# Patient Record
Sex: MALE | Race: WHITE | URBAN - METROPOLITAN AREA
[De-identification: names, ages, dates, MRNs, and addresses within clinical notes are randomized per-mention and may not be internally consistent; named-entity substitution may affect disease eponyms.]

---

## 2019-02-21 ENCOUNTER — OFFICE VISIT (OUTPATIENT)
Dept: FAMILY MEDICINE CLINIC | Age: 44
End: 2019-02-21

## 2019-02-21 VITALS
RESPIRATION RATE: 18 BRPM | TEMPERATURE: 98 F | WEIGHT: 219.8 LBS | DIASTOLIC BLOOD PRESSURE: 86 MMHG | OXYGEN SATURATION: 97 % | HEART RATE: 70 BPM | SYSTOLIC BLOOD PRESSURE: 136 MMHG

## 2019-02-21 DIAGNOSIS — F33.2 SEVERE EPISODE OF RECURRENT MAJOR DEPRESSIVE DISORDER, WITHOUT PSYCHOTIC FEATURES (HCC): Primary | ICD-10-CM

## 2019-02-21 DIAGNOSIS — I10 ESSENTIAL HYPERTENSION: ICD-10-CM

## 2019-02-21 DIAGNOSIS — F11.20 HEROIN ADDICTION (HCC): ICD-10-CM

## 2019-02-21 RX ORDER — METOPROLOL SUCCINATE 50 MG/1
50 TABLET, EXTENDED RELEASE ORAL DAILY
Qty: 50 TAB | Refills: 0 | Status: SHIPPED | OUTPATIENT
Start: 2019-02-21 | End: 2019-02-25 | Stop reason: CLARIF

## 2019-02-21 RX ORDER — BUPRENORPHINE HYDROCHLORIDE AND NALOXONE HYDROCHLORIDE DIHYDRATE 2; .5 MG/1; MG/1
1 TABLET SUBLINGUAL 2 TIMES DAILY
COMMUNITY
End: 2020-04-10

## 2019-02-21 RX ORDER — METOPROLOL TARTRATE 50 MG/1
TABLET ORAL 2 TIMES DAILY
COMMUNITY
End: 2019-02-25 | Stop reason: SDUPTHER

## 2019-02-21 RX ORDER — SERTRALINE HYDROCHLORIDE 50 MG/1
50 TABLET, FILM COATED ORAL DAILY
Qty: 30 TAB | Refills: 0 | Status: SHIPPED | OUTPATIENT
Start: 2019-02-21 | End: 2020-04-10

## 2019-02-21 RX ORDER — METOPROLOL SUCCINATE 50 MG/1
50 TABLET, EXTENDED RELEASE ORAL 2 TIMES DAILY
Qty: 60 TAB | Refills: 0 | Status: SHIPPED | OUTPATIENT
Start: 2019-02-21 | End: 2019-02-25 | Stop reason: CLARIF

## 2019-02-21 NOTE — PROGRESS NOTES
Identified Patient with two Patient identifiers (Name and ). Two Patient Identifiers confirmed. Reviewed record in preparation for visit and have obtained necessary documentation. Chief Complaint Patient presents with [facility] Establish Care Discuss Labs  Hypertension  Medication Refill  Insomnia Per Patient Problems with staying and falling asleep Visit Vitals /86 (BP 1 Location: Right arm, BP Patient Position: Sitting) Pulse 70 Wt 219 lb 12.8 oz (99.7 kg) SpO2 97% 1. Have you been to the ER, urgent care clinic since your last visit? Hospitalized since your last visit? No 
 
2. Have you seen or consulted any other health care providers outside of the [address] [place] [address] since your last visit? Include any pap smears or colon screening.  No

## 2019-02-21 NOTE — PATIENT INSTRUCTIONS
Learning about Antidepressants What are antidepressants? Antidepressants are medicines that change the levels of some chemicals in the brain. They can help affect moods. There are different types that work on brain chemicals in different ways. These medicines can help treat depression. They are also used for anxiety, eating disorders, post-traumatic stress disorder, and chronic pain. What are some examples? Here are some examples of antidepressants. For each item in the list, the generic name is first, followed by any brand names. · amitriptyline · bupropion (Wellbutrin) · citalopram (Celexa) · fluoxetine (Prozac) · sertraline (Zoloft) · venlafaxine (Effexor) This is not a complete list of antidepressants. What are the side effects? Side effects may vary. They depend on the medicine you take. But common ones include: 
· Nausea. · Dry mouth. · Loss of appetite. · Diarrhea or constipation. · Sexual problems. (These may include loss of desire or erection problems.) · Headaches. · Trouble falling asleep. Or you may wake up a lot at night. · Weight gain. · Feeling nervous or on edge. · Feeling drowsy in the daytime. Problems with sexual arousal and a lack of interest in sex are common side effects. If this happens to you, talk to your doctor. There are other medicines that may help with these problems. Mild side effects may go away after you take the medicine for a few weeks. If the side effects bother you, talk with your doctor. He or she may prescribe a different medicine. Or the doctor may suggest ways to manage your side effects. How do you take antidepressants safely? If your doctor has prescribed antidepressants, here are some tips to help you get the most from your medicine. · Share your health history with your doctor. Tell your doctor about your other medicines and health conditions.  This information can affect which antidepressant your doctor prescribes for you. Tell your doctor if you or anyone in your family has had bipolar disorder or used antidepressants before. · Take your medicine as prescribed. It works best and has fewer side effects when you take it exactly as your doctor prescribed. If you miss a dose, and if it's not too late in the day, it's okay to take it. Don't double up doses. · Keep taking your medicine for a while. Taking it for at least 6 months after you feel better can help keep you from getting symptoms again. · Be prepared to try different medicines and doses. You may start to feel better within 1 to 3 weeks after you start the medicine. If you have not improved at all in 3 weeks, your doctor may increase your dose. Or you may need to try a different medicine. Over time, your doctor may need to adjust your dose again to manage your symptoms better. · Don't take any new medicines unless you talk to your doctor first.  
Even common medicines like aspirin and some vitamins and herbs can cause problems if you use them while you take antidepressants. · Do not drink alcohol. It can make the side effects worse. · Don't stop taking your medicine on your own. Quitting antidepressants too quickly can cause withdrawal symptoms. It can also cause depression to return. If you are having a problem with your medicine or are ready to quit taking it, work with your doctor. He or she can help you to slowly reduce the dose over a span of a few weeks. · Call your doctor right away for serious side effects. Examples include: 
? Warning signs of suicide. These include talking or writing about death, giving away belongings, or withdrawing from family and friends. ? Manic behavior. This includes having very high energy, sleeping less than normal, being impulsive, or being grouchy or restless. How might you feel about taking antidepressants?  
You may feel nervous, relieved, or a little surprised that your doctor is talking to you about antidepressants. And the thought of needing to take medicine for a long time can be scary. But whether you are depressed or you have another condition, you are taking a step to help yourself feel better. Follow-up care is a key part of your treatment and safety. Be sure to make and go to all appointments, and call your doctor if you are having problems. It's also a good idea to know your test results and keep a list of the medicines you take. Where can you learn more? Go to http://myla-janice.info/. Enter A230 in the search box to learn more about \"Learning about Antidepressants. \" Current as of: September 11, 2018 Content Version: 11.9 © 9723-4795 [employer]. Care instructions adapted under license by [employer] (which disclaims liability or warranty for this information). If you have questions about a medical condition or this instruction, always ask your healthcare professional. [place] [address] any warranty or liability for your use of this information.

## 2019-02-22 LAB
ALBUMIN SERPL-MCNC: 4.7 G/DL (ref 3.5–5.5)
ALBUMIN/GLOB SERPL: 1.1 {RATIO} (ref 1.2–2.2)
ALP SERPL-CCNC: 90 IU/L (ref 39–117)
ALT SERPL-CCNC: 17 IU/L (ref 0–44)
AST SERPL-CCNC: 20 IU/L (ref 0–40)
BASOPHILS # BLD AUTO: 0 X10E3/UL (ref 0–0.2)
BASOPHILS NFR BLD AUTO: 0 %
BILIRUB SERPL-MCNC: 0.3 MG/DL (ref 0–1.2)
BUN SERPL-MCNC: 14 MG/DL (ref 6–24)
BUN/CREAT SERPL: 16 (ref 9–20)
CALCIUM SERPL-MCNC: 10.1 MG/DL (ref 8.7–10.2)
CHLORIDE SERPL-SCNC: 100 MMOL/L (ref 96–106)
CHOLEST SERPL-MCNC: 214 MG/DL (ref 100–199)
CO2 SERPL-SCNC: 19 MMOL/L (ref 20–29)
CREAT SERPL-MCNC: 0.87 MG/DL (ref 0.76–1.27)
EOSINOPHIL # BLD AUTO: 0.2 X10E3/UL (ref 0–0.4)
EOSINOPHIL NFR BLD AUTO: 2 %
ERYTHROCYTE [DISTWIDTH] IN BLOOD BY AUTOMATED COUNT: 17 % (ref 12.3–15.4)
GLOBULIN SER CALC-MCNC: 4.1 G/DL (ref 1.5–4.5)
GLUCOSE SERPL-MCNC: 86 MG/DL (ref 65–99)
HCT VFR BLD AUTO: 46.5 % (ref 37.5–51)
HCV AB S/CO SERPL IA: >11 S/CO RATIO (ref 0–0.9)
HDLC SERPL-MCNC: 57 MG/DL
HGB BLD-MCNC: 15.3 G/DL (ref 13–17.7)
HIV 1+2 AB+HIV1 P24 AG SERPL QL IA: NON REACTIVE
IMM GRANULOCYTES # BLD AUTO: 0 X10E3/UL (ref 0–0.1)
IMM GRANULOCYTES NFR BLD AUTO: 0 %
INTERPRETATION, 910389: NORMAL
LDLC SERPL CALC-MCNC: 139 MG/DL (ref 0–99)
LYMPHOCYTES # BLD AUTO: 3.8 X10E3/UL (ref 0.7–3.1)
LYMPHOCYTES NFR BLD AUTO: 41 %
MCH RBC QN AUTO: 28 PG (ref 26.6–33)
MCHC RBC AUTO-ENTMCNC: 32.9 G/DL (ref 31.5–35.7)
MCV RBC AUTO: 85 FL (ref 79–97)
MONOCYTES # BLD AUTO: 0.9 X10E3/UL (ref 0.1–0.9)
MONOCYTES NFR BLD AUTO: 9 %
NEUTROPHILS # BLD AUTO: 4.3 X10E3/UL (ref 1.4–7)
NEUTROPHILS NFR BLD AUTO: 48 %
PLATELET # BLD AUTO: 248 X10E3/UL (ref 150–379)
POTASSIUM SERPL-SCNC: 5.1 MMOL/L (ref 3.5–5.2)
PROT SERPL-MCNC: 8.8 G/DL (ref 6–8.5)
RBC # BLD AUTO: 5.46 X10E6/UL (ref 4.14–5.8)
SODIUM SERPL-SCNC: 141 MMOL/L (ref 134–144)
TRIGL SERPL-MCNC: 91 MG/DL (ref 0–149)
VLDLC SERPL CALC-MCNC: 18 MG/DL (ref 5–40)
WBC # BLD AUTO: 9.1 X10E3/UL (ref 3.4–10.8)

## 2019-02-24 ENCOUNTER — PATIENT MESSAGE (OUTPATIENT)
Dept: FAMILY MEDICINE CLINIC | Age: 44
End: 2019-02-24

## 2019-02-25 ENCOUNTER — TELEPHONE (OUTPATIENT)
Dept: FAMILY MEDICINE CLINIC | Age: 44
End: 2019-02-25

## 2019-02-25 RX ORDER — METOPROLOL TARTRATE 50 MG/1
50 TABLET ORAL 2 TIMES DAILY
Qty: 60 TAB | Refills: 0 | Status: SHIPPED | OUTPATIENT
Start: 2019-02-25 | End: 2020-04-10

## 2019-02-25 NOTE — PROGRESS NOTES
Baron Barr is an 41 y.o. male with known HTN and opioid addiction who presents to establish care. Opioid Addiction: 
Started using heroin at age 27 for fun with friends. He has tried quitting in the past and would go a couple of years clean and then relapse. At this time patient mentions being highly motivated to quit. He went to a suboxone clinic 3 weeks ago where they gave him one week prescribtion of subaxone but require him to assist group meetings to receive refills. Patient mentions he has not returned to the clinic because Arthuro Stallion is not a group meeting type\" because attending those meetings Manju Able makes him want to use more\" and ends up meeting more drug dealers and addicts who are there because they are court appointed and have no interest quitting. Mr. Dennis Burns would be okay with one on one therapy. Since running out of suboxone two weeks ago he is currently using heroin. Sad mood: 
Patient mentions that he has always felt sad but has recently worsened. He denies any SI/HI. PHQ9: 20 HTN Patient was diagnosed with HTN and tachycardia when he was 18, and was started on metoprolol 50 mg twice a day and has been on it since.  
  
  
Review of Systems ROS Review of Systems Constitutional: Negative for chills, fever and weight loss. HENT: Negative for congestion, hearing loss and sore throat. Eyes: Negative for blurred vision. Respiratory: Negative for cough and wheezing. Cardiovascular: Negative for chest pain, palpitations, orthopnea and leg swelling. Gastrointestinal: Negative for abdominal pain, blood in stool, constipation, diarrhea, heartburn, nausea and vomiting. Genitourinary: Negative for dysuria and frequency. Musculoskeletal: Negative for myalgias. Neurological: Negative for weakness. Endo/Heme/Allergies: Does not bruise/bleed easily. Psychiatric/Behavioral: Positive for depression and substance abuse. Negative for suicidal ideas.  
 
 
  
Current Medications Current medications include:  
    
Current Outpatient Medications Medication Sig  
 metoprolol tartrate (LOPRESSOR) 50 mg tablet Take  by mouth two (2) times a day.  buprenorphine-naloxone (SUBOXONE) 2-0.5 mg subl 1 Tab by SubLINGual route two (2) times a day.  
  
No current facility-administered medications for this visit.   
  
  
Allergies No Known Allergies 
  
Past Medical History Past Medical History:  
Diagnosis Date  Hx of opioid abuse    
 Hypertension    
  
  
Past Surgical History History reviewed. No pertinent surgical history. 
  
Family History Family History Problem Relation Age of Onset  COPD Mother    
 No Known Problems Father    
 
  
Social History Social History  
  
     
Socioeconomic History  Marital status:   
    Spouse name: Not on file  Number of children: Not on file  Years of education: Not on file  Highest education level: Not on file Social Needs  Financial resource strain: Not on file  Food insecurity - worry: Not on file  Food insecurity - inability: Not on file  Transportation needs - medical: Not on file  Transportation needs - non-medical: Not on file Occupational History  Not on file Tobacco Use  Smoking status: Current Every Day Smoker  
    Packs/day: 1.00  Smokeless tobacco: Never Used Substance and Sexual Activity  Alcohol use: No  
    Frequency: Never  Drug use: Yes  
    Types: Opiates  Sexual activity: Yes Other Topics Concern  Not on file Social History Narrative  Not on file  
  
  
Immunizations 
  There is no immunization history on file for this patient. 
  
Health Maintenance HIV testing never tested Hepatitis C testing not tested Lung cancer screening has not been performed 
  
Objective Vital Signs Visit Vitals /86 (BP 1 Location: Right arm, BP Patient Position: Sitting) Pulse 70 Temp 98 °F (36.7 °C) (Oral) Resp 18 Wt 219 lb 12.8 oz (99.7 kg) SpO2 97%  
  
  
Physical Exam 
General appearance - alert, well appearing, and in no distress Eyes - pupils equal and reactive, extraocular eye movements intact Ears - bilateral TM's and external ear canals normal 
Nose - normal and patent, no erythema, discharge or polyps Mouth - mucous membranes moist, pharynx normal without lesions Neck - supple, no significant adenopathy Chest - clear to auscultation, no wheezes, rales or rhonchi, symmetric air entry Heart - normal rate, regular rhythm, normal S1, S2, no murmurs, rubs, clicks or gallops Abdomen - soft, nontender, nondistended, no masses or organomegaly Neurological - alert, oriented, normal speech, no focal findings or movement disorder noted Musculoskeletal - no joint tenderness, deformity or swelling Extremities - peripheral pulses normal, no pedal edema, no clubbing or cyanosis 
  
Assessment:  
Allison De is a 41 y.o. here to establish to care  
  
Plan: 1. Severe Depression : PHQ 9 today was 20 - Will start patient on zoloft 50 mg tablet daily. 
- Patient understands that it will take at least a couple of weeks before noticing effects of medications. 2. Opioid Addiction: Has been using heroin for 20+ years. - Patient was given a list of facilities that prescribe suboxone. Patient is to make appointment for initial evaluation.  
- Will order Hep C Ab and HIV. 3. HTN: Stable. BP today 136/86.  
- Will continue metoprolol 50 mg BID given history of tachycardia. Patient denies using any cocaine. 4. Health maintenance  
· Counseled re: diet, exercise, healthy lifestyle · Ordered CMP, CBC and lipid panel · The patient was counseled on the dangers of tobacco use.  At this time will try to work on quitting Heroin and will then focus on quitting smoking.  
  
Follow-up on 3/20 with Dr. Bárbara Ortega.  
  
I discussed the aforementioned diagnoses with the patient as well as the plan of care. All questions were answered and an AVS was provided.  
  
I discussed this patient with Dr. Santi Mckinley (Attending Physician) 
  
  
Signed By:  Sarah Garcia MD  
  Family Medicine Resident, PGY1

## 2019-02-25 NOTE — TELEPHONE ENCOUNTER
Caitlin Whitaker-[facility]---[phone]  She called for advice on the two metoprolols. She has viewed my chart, and it says he is suppose to taking the two of them.

## 2019-02-26 ENCOUNTER — TELEPHONE (OUTPATIENT)
Dept: FAMILY MEDICINE CLINIC | Age: 44
End: 2019-02-26

## 2019-02-26 DIAGNOSIS — R76.8 HEPATITIS C ANTIBODY POSITIVE IN BLOOD: Primary | ICD-10-CM

## 2019-02-26 PROBLEM — F11.20 HEROIN ADDICTION (HCC): Status: ACTIVE | Noted: 2019-02-26

## 2019-02-26 PROBLEM — F33.2 SEVERE EPISODE OF RECURRENT MAJOR DEPRESSIVE DISORDER, WITHOUT PSYCHOTIC FEATURES (HCC): Status: ACTIVE | Noted: 2019-02-26

## 2019-02-26 PROBLEM — I10 ESSENTIAL HYPERTENSION: Status: ACTIVE | Noted: 2019-02-26

## 2019-02-26 NOTE — PROGRESS NOTES
HIV negative Hep C virus Ab positive: Will order HCV RNA and will refer to gastroenterologist to manage treatment. CBC unremarkable CMP unremarkable, LFTs are normal.  
Lipid panel T. Cholesterol elevated 214 and LDL elevated 139, Triglyceride 91, HDL 57. Patient has an ASCVD risk of 5.1% of having a cardiovascular event in the next 10 years. It is recommended he start a moderate intensity statin however will not start until after he visits Gastroenterology given his HepC status.

## 2019-02-26 NOTE — TELEPHONE ENCOUNTER
From: Ronit Morales  To: Radha Mcneill MD  Sent: 2/24/2019 5:08 PM EST  Subject: Prescription Question    Is metoprolol er succinate 50 mg and metoprolol tart 50 mg the same thing? I have been on metoprolol tart 50 mg for years. It's the only medication that has worked. Do you recommend me trying the one prescribed by the practice or going back to my old medication?

## 2019-02-26 NOTE — TELEPHONE ENCOUNTER
Spoke to Mr. Sven Peraza and his wife. They are aware of lab results and the importance of receiving hepatitis c treatment. Referral was placed to Dr. Dilma Lewis gastroenterologist who would be ablle to provide treatment.

## 2020-04-10 ENCOUNTER — TELEPHONE (OUTPATIENT)
Dept: INTERNAL MEDICINE CLINIC | Age: 45
End: 2020-04-10

## 2020-04-10 ENCOUNTER — VIRTUAL VISIT (OUTPATIENT)
Dept: INTERNAL MEDICINE CLINIC | Age: 45
End: 2020-04-10

## 2020-04-10 VITALS
BODY MASS INDEX: 30.8 KG/M2 | WEIGHT: 240 LBS | DIASTOLIC BLOOD PRESSURE: 77 MMHG | SYSTOLIC BLOOD PRESSURE: 130 MMHG | HEIGHT: 74 IN

## 2020-04-10 DIAGNOSIS — F41.9 ANXIETY AND DEPRESSION: ICD-10-CM

## 2020-04-10 DIAGNOSIS — Z00.00 ROUTINE ADULT HEALTH MAINTENANCE: ICD-10-CM

## 2020-04-10 DIAGNOSIS — E66.9 OBESITY (BMI 30-39.9): ICD-10-CM

## 2020-04-10 DIAGNOSIS — R76.8 HEPATITIS C ANTIBODY TEST POSITIVE: ICD-10-CM

## 2020-04-10 DIAGNOSIS — I10 ESSENTIAL HYPERTENSION: ICD-10-CM

## 2020-04-10 DIAGNOSIS — I10 ESSENTIAL HYPERTENSION: Primary | ICD-10-CM

## 2020-04-10 DIAGNOSIS — F32.A ANXIETY AND DEPRESSION: ICD-10-CM

## 2020-04-10 DIAGNOSIS — Z72.0 TOBACCO USE: ICD-10-CM

## 2020-04-10 RX ORDER — METOPROLOL SUCCINATE 50 MG/1
TABLET, EXTENDED RELEASE ORAL 2 TIMES DAILY
COMMUNITY
End: 2020-04-13 | Stop reason: SDUPTHER

## 2020-04-10 NOTE — TELEPHONE ENCOUNTER
Called pharmacy  They will switch medication to metoprolol succinate (TOPROL-XL) 50 mg XL tablet due to cost per patient's request.
Please canc Metoprolol at [facility] per patient's wife and resend to [facility], states the script is costing over $100 at [facility] , please send to [phone] today
Verbalized Understanding/Simple: Patient demonstrates quick and easy understanding

## 2020-04-10 NOTE — PROGRESS NOTES
Consent: Yasmeen Dodd, who was seen by synchronous (real-time) audio-video technology, and/or his healthcare decision maker, is aware that this patient-initiated, Telehealth encounter on 4/10/2020 is a billable service, with coverage as determined by his insurance carrier. He is aware that he may receive a bill and has provided verbal consent to proceed: Yes. I was in the office while conducting this encounter. This visit was done with doxy. me    Assessment and Plan   Diagnoses and all orders for this visit:    1. Essential hypertension  -     METABOLIC PANEL, COMPREHENSIVE; Future  -     metoprolol succinate 50 mg CSpX; Take 50 mg by mouth two (2) times a day. With a history of tachycardia. Well-controlled. Refill provided    2. Obesity (BMI 30-39.9)  -     HEMOGLOBIN A1C WITH EAG; Future  -     LIPID PANEL; Future  Discussed diet and lifestyle changes that he can make    3. Hepatitis C antibody test positive  -     CBC WITH AUTOMATED DIFF; Future  -     HEPATITIS C QT BY PCR WITH REFLEX GENOTYPE; Future  Antibody was positive last year. Patient was incarcerated shortly after that visit so he was unable to get any follow-up. Will get HCVRNA. If positive, will refer to GI. Discussed the risk of not treating for hepatitis C including cirrhosis. 4. Anxiety and depression  Reports mood is well controlled and does not need medications    5. Tobacco use  Currently not interested in quitting. Counseled patient on risks of continued tobacco use and encouraged him to consider quitting    6. Routine adult health maintenance  Needs pneumonia vaccine during next in-person visit      We discussed the expected course, resolution and complications of the diagnosis(es) in detail. Medication risks, benefits, costs, interactions, and alternatives were discussed as indicated. I advised him to contact the office if his condition worsens, changes or fails to improve as anticipated.  He expressed understanding with the diagnosis(es) and plan. Return to clinic: Pending lab work    Tami Gottron, MD  [facility]  4/10/2020    No future appointments. History of Present Illness   Chief Complaint   Establish care    Marcus Otero is a 45 y.o. male     Hypertension/tachycardia has been on blood pressure medication since he was 27. Currently on metoprolol 50 twice a day. Blood pressure today 130/77. Denies any chest pain, shortness of breath, lightheadedness, dizziness. Requesting refill. Depression/anxiety much improved and not currently on medications. Was previously prescribed Zoloft. Not interested in any medications at this time. History of heroin use reports that he has been clean for 3 years although his office visit from last year reports that he was actively using. Tobacco usesmokes 1 pack a day. Not currently interested in quitting    Review of Systems   Constitutional: Negative for chills and fever. HENT: Negative for hearing loss. Eyes: Negative for blurred vision. Respiratory: Negative for shortness of breath. Cardiovascular: Negative for chest pain. Gastrointestinal: Negative for abdominal pain, blood in stool, constipation, diarrhea, melena, nausea and vomiting. Genitourinary: Negative for dysuria and hematuria. Musculoskeletal: Negative for joint pain. Skin: Negative for rash. Neurological: Negative for headaches. Past Medical History   No Known Allergies     Current Outpatient Medications   Medication Sig    metoprolol succinate 50 mg CSpX Take 50 mg by mouth two (2) times a day. No current facility-administered medications for this visit. Patient Active Problem List   Diagnosis Code    Essential hypertension I10    Heroin addiction ([facility] [address] F11.20    Severe episode of recurrent major depressive disorder, without psychotic features ([facility] [address] F33.2     History reviewed. No pertinent surgical history.    Social History     Tobacco Use    Smoking status: Current Every Day Smoker     Packs/day: 1.00    Smokeless tobacco: Never Used    Tobacco comment: started age 18   Substance Use Topics    Alcohol use: No     Frequency: Never      Family History   Problem Relation Age of Onset    COPD Mother     No Known Problems Father         Objective   Vitals:       Visit Vitals  /77 Comment: verbal   Ht 6' 2\" (1.88 m)   Wt 240 lb (108.9 kg)   BMI 30.81 kg/m²        Physical Exam  Constitutional:       Appearance: Normal appearance. HENT:      Right Ear: External ear normal.      Left Ear: External ear normal.   Eyes:      Conjunctiva/sclera: Conjunctivae normal.   Neck:      Musculoskeletal: Normal range of motion. Pulmonary:      Effort: No respiratory distress. Neurological:      Mental Status: He is alert. Psychiatric:         Mood and Affect: Mood normal.         Thought Content: Thought content normal.         Judgment: Judgment normal.          Voice recognition software is utilized and this note may contain transcription errors     Negra Gudino is a 45 y.o. male being evaluated by a video visit encounter for concerns as above. A caregiver was present when appropriate. Due to this being a TeleHealth encounter (During TZBPE-37 public health emergency), evaluation of the following organ systems was limited: Vitals/Constitutional/EENT/Resp/CV/GI//MS/Neuro/Skin/Heme-Lymph-Imm. Pursuant to the emergency declaration under the [facility], [facility]5 waiver authority and the [employer] and [employer] Act, this Virtual  Visit was conducted, with patient's (and/or legal guardian's) consent, to reduce the patient's risk of exposure to COVID-19 and provide necessary medical care. Services were provided through a video synchronous discussion virtually to substitute for in-person clinic visit.

## 2020-04-13 RX ORDER — METOPROLOL SUCCINATE 50 MG/1
50 TABLET, EXTENDED RELEASE ORAL 2 TIMES DAILY
Qty: 180 TAB | Refills: 2 | Status: SHIPPED | OUTPATIENT
Start: 2020-04-13

## 2020-04-13 NOTE — TELEPHONE ENCOUNTER
Wife states metoprolol succinate (TOPROL-XL) 50 mg XL tablet was never sent to [facility]. Please send today and call wife to inform. Thanks.

## 2022-03-18 PROBLEM — F11.20 HEROIN ADDICTION (HCC): Status: ACTIVE | Noted: 2019-02-26

## 2022-03-18 PROBLEM — F41.9 ANXIETY AND DEPRESSION: Status: ACTIVE | Noted: 2020-04-10

## 2022-03-18 PROBLEM — Z72.0 TOBACCO USE: Status: ACTIVE | Noted: 2020-04-10

## 2022-03-18 PROBLEM — F32.A ANXIETY AND DEPRESSION: Status: ACTIVE | Noted: 2020-04-10

## 2022-03-19 PROBLEM — I10 ESSENTIAL HYPERTENSION: Status: ACTIVE | Noted: 2019-02-26

## 2023-05-24 RX ORDER — METOPROLOL SUCCINATE 50 MG/1
50 TABLET, EXTENDED RELEASE ORAL 2 TIMES DAILY
COMMUNITY
Start: 2020-04-13